# Patient Record
Sex: MALE | Race: WHITE | NOT HISPANIC OR LATINO | Employment: FULL TIME | URBAN - METROPOLITAN AREA
[De-identification: names, ages, dates, MRNs, and addresses within clinical notes are randomized per-mention and may not be internally consistent; named-entity substitution may affect disease eponyms.]

---

## 2023-07-21 ENCOUNTER — OFFICE VISIT (OUTPATIENT)
Dept: PRIMARY CARE | Facility: CLINIC | Age: 58
End: 2023-07-21
Payer: COMMERCIAL

## 2023-07-21 VITALS
DIASTOLIC BLOOD PRESSURE: 64 MMHG | WEIGHT: 210.8 LBS | BODY MASS INDEX: 30.18 KG/M2 | HEIGHT: 70 IN | SYSTOLIC BLOOD PRESSURE: 118 MMHG

## 2023-07-21 DIAGNOSIS — E03.9 HYPOTHYROIDISM, UNSPECIFIED TYPE: ICD-10-CM

## 2023-07-21 DIAGNOSIS — E78.5 DYSLIPIDEMIA: ICD-10-CM

## 2023-07-21 DIAGNOSIS — D64.9 ANEMIA, UNSPECIFIED TYPE: Primary | ICD-10-CM

## 2023-07-21 DIAGNOSIS — R73.02 IGT (IMPAIRED GLUCOSE TOLERANCE): ICD-10-CM

## 2023-07-21 DIAGNOSIS — Z00.00 ROUTINE GENERAL MEDICAL EXAMINATION AT A HEALTH CARE FACILITY: ICD-10-CM

## 2023-07-21 PROCEDURE — G0442 ANNUAL ALCOHOL SCREEN 15 MIN: HCPCS | Performed by: EMERGENCY MEDICINE

## 2023-07-21 PROCEDURE — G0444 DEPRESSION SCREEN ANNUAL: HCPCS | Performed by: EMERGENCY MEDICINE

## 2023-07-21 PROCEDURE — 1036F TOBACCO NON-USER: CPT | Performed by: EMERGENCY MEDICINE

## 2023-07-21 PROCEDURE — 99396 PREV VISIT EST AGE 40-64: CPT | Performed by: EMERGENCY MEDICINE

## 2023-07-21 PROCEDURE — G0446 INTENS BEHAVE THER CARDIO DX: HCPCS | Performed by: EMERGENCY MEDICINE

## 2023-07-21 RX ORDER — MULTIVIT-MINS/IRON/FOLIC/LYCOP 8-200-600
1 TABLET ORAL DAILY
COMMUNITY
Start: 2019-09-12

## 2023-07-21 RX ORDER — ATORVASTATIN CALCIUM 20 MG/1
1 TABLET, FILM COATED ORAL DAILY
COMMUNITY
Start: 2019-09-13 | End: 2024-01-26 | Stop reason: WASHOUT

## 2023-07-21 NOTE — PROGRESS NOTES
Subjective   Patient ID: Beau Nuñez is a 58 y.o. male who presents for Establish Care.    Assessment/Plan   Problem List Items Addressed This Visit    None    Hyperlipidemia-states that all his previous numbers have been normal.  We will recheck his lipid panel.  If normal, will discontinue his Lipitor  He has made significant dietary changes    Preventive care  Had a colonoscopy couple of years ago which was negative.  Does not have any family history of colon cancer  There is no family history of prostate cancer and therefore PSA will not be tested  Due to have low-dose CT of the thorax in view of his chronic smoking history.  This is being followed by pulmonary Dr.      PH Q-9 depression screening was completed by authorized employee of the practice and  explained the questionnaire and discussed the answers with the patient.    I screened for alcohol use    We had face-to-face discussion with this patient about the cardiovascular risk and behavioral therapies of nutritional choices, exercise and elimination of habits contradicting to the risk. We agreed on how they may be able to reduce their current cardiovascular risk.    Source of history: Nurse, Medical personnel, Medical record, Patient.  History limitation: None.    HPI    58-year-old for new patient physical  Does not have any complaints or issues at this time    Past health history-hyperlipidemia  Past surgical history-hernia surgery    Social history-chronic smoker.  Quit in 2010  Occasional alcohol  Denies drugs    Family history  Multiple family members of diabetes  Mother had breast cancer    No Known Allergies    Current Outpatient Medications   Medication Sig Dispense Refill    atorvastatin (Lipitor) 20 mg tablet Take 1 tablet (20 mg) by mouth once daily.      multivit-min-FA-lycopen-lutein (Men 50 Plus Multivitamin) 300-600-300 mcg tablet Take 1 tablet by mouth once daily.       No current facility-administered medications for this visit.  "      Objective   Visit Vitals  /64   Ht 1.778 m (5' 10\")   Wt 95.6 kg (210 lb 12.8 oz)   BMI 30.25 kg/m²   Smoking Status Former   BSA 2.17 m²     Physical Exam  Vital signs as per nursing/MA documentation  General appearance: Alert and in no acute distress  HEENT: Normal Inspection  Neck - Normal Inspection  Respiratory : No respiratory distress. Lungs are clear   Cardiovascular: heart rate normal. No gallop  Back - normal inspection  Skin inspection:Warm  Musculoskeletal : No deformities  Neuro : Limited exam. Baseline    Review of Systems   Comprehensive review of systems as allowed by patient condition and nursing input is negative    Results including lab work, imaging reports were reviewed and wherever possible, independently verified    Family History   Problem Relation Name Age of Onset    Cancer Mother       Social History     Socioeconomic History    Marital status:      Spouse name: None    Number of children: None    Years of education: None    Highest education level: None   Occupational History    None   Tobacco Use    Smoking status: Former     Types: Cigarettes    Smokeless tobacco: Never   Substance and Sexual Activity    Alcohol use: Not Currently    Drug use: None    Sexual activity: None   Other Topics Concern    None   Social History Narrative    None     Social Determinants of Health     Financial Resource Strain: Not on file   Food Insecurity: Not on file   Transportation Needs: Not on file   Physical Activity: Not on file   Stress: Not on file   Social Connections: Not on file   Intimate Partner Violence: Not on file   Housing Stability: Not on file     Past Medical History:   Diagnosis Date    Calculus of kidney     Calcium kidney stone    Cyst of kidney, acquired 09/12/2019    Renal cyst    Disorder of the skin and subcutaneous tissue, unspecified 09/12/2019    Skin lesion    Encounter for screening for malignant neoplasm of colon 11/13/2021    Screening for colon cancer    " Personal history of other diseases of the respiratory system 03/30/2022    History of upper respiratory infection    Unilateral inguinal hernia, without obstruction or gangrene, not specified as recurrent 12/09/2020    Reducible right inguinal hernia     Past Surgical History:   Procedure Laterality Date    OTHER SURGICAL HISTORY  03/18/2022    Hernia repair       Charting was completed using voice recognition technology and may include unintended errors.

## 2023-08-11 LAB
NON-UH HIE A/G RATIO: 1.7
NON-UH HIE ALB: 4.3 G/DL (ref 3.4–5)
NON-UH HIE ALK PHOS: 62 UNIT/L (ref 46–116)
NON-UH HIE BASO COUNT: 0.04 X1000 (ref 0–0.2)
NON-UH HIE BASOS %: 0.8 %
NON-UH HIE BILIRUBIN, TOTAL: 0.6 MG/DL (ref 0.2–1)
NON-UH HIE BUN/CREAT RATIO: 17.8
NON-UH HIE BUN: 16 MG/DL (ref 9–23)
NON-UH HIE CALCIUM: 9.6 MG/DL (ref 8.7–10.4)
NON-UH HIE CALCULATED LDL CHOLESTEROL: 120 MG/DL (ref 60–130)
NON-UH HIE CALCULATED OSMOLALITY: 277 MOSM/KG (ref 275–295)
NON-UH HIE CHLORIDE: 106 MMOL/L (ref 98–107)
NON-UH HIE CHOLESTEROL: 181 MG/DL (ref 100–200)
NON-UH HIE CO2, VENOUS: 28 MMOL/L (ref 20–31)
NON-UH HIE CREATININE: 0.9 MG/DL (ref 0.6–1.1)
NON-UH HIE DIFF?: NO
NON-UH HIE EOS COUNT: 0.08 X1000 (ref 0–0.5)
NON-UH HIE EOSIN %: 1.6 %
NON-UH HIE GFR AA: >60
NON-UH HIE GLOBULIN: 2.6 G/DL
NON-UH HIE GLOMERULAR FILTRATION RATE: >60 ML/MIN/1.73M?
NON-UH HIE GLUCOSE: 93 MG/DL (ref 74–106)
NON-UH HIE GOT: 27 UNIT/L (ref 15–37)
NON-UH HIE GPT: 33 UNIT/L (ref 10–49)
NON-UH HIE HCT: 44.9 % (ref 41–52)
NON-UH HIE HDL CHOLESTEROL: 41 MG/DL (ref 40–60)
NON-UH HIE HGB: 14.9 G/DL (ref 13.5–17.5)
NON-UH HIE INSTR WBC: 4.8
NON-UH HIE K: 4.2 MMOL/L (ref 3.5–5.1)
NON-UH HIE LYMPH %: 27.9 %
NON-UH HIE LYMPH COUNT: 1.34 X1000 (ref 1.2–4.8)
NON-UH HIE MCH: 28.7 PG (ref 27–34)
NON-UH HIE MCHC: 33.2 G/DL (ref 32–37)
NON-UH HIE MCV: 86.5 FL (ref 80–100)
NON-UH HIE MONO %: 9.4 %
NON-UH HIE MONO COUNT: 0.45 X1000 (ref 0.1–1)
NON-UH HIE MPV: 7.1 FL (ref 7.4–10.4)
NON-UH HIE NA: 138 MMOL/L (ref 135–145)
NON-UH HIE NEUTROPHIL %: 60.4 %
NON-UH HIE NEUTROPHIL COUNT (ANC): 2.89 X1000 (ref 1.4–8.8)
NON-UH HIE NUCLEATED RBC: 0 /100WBC
NON-UH HIE PLATELET: 289 X10 (ref 150–450)
NON-UH HIE RBC: 5.18 X10 (ref 4.7–6.1)
NON-UH HIE RDW: 13.4 % (ref 11.5–14.5)
NON-UH HIE TOTAL CHOL/HDL CHOL RATIO: 4.4
NON-UH HIE TOTAL PROTEIN: 6.9 G/DL (ref 5.7–8.2)
NON-UH HIE TRIGLYCERIDES: 98 MG/DL (ref 30–150)
NON-UH HIE TSH: 2.47 UIU/ML (ref 0.55–4.78)
NON-UH HIE WBC: 4.8 X10 (ref 4.5–11)

## 2023-08-12 LAB — NON-UH HIE HGB A1C: 5.6 %

## 2024-01-26 ENCOUNTER — OFFICE VISIT (OUTPATIENT)
Dept: PRIMARY CARE | Facility: CLINIC | Age: 59
End: 2024-01-26
Payer: COMMERCIAL

## 2024-01-26 VITALS
DIASTOLIC BLOOD PRESSURE: 85 MMHG | WEIGHT: 214 LBS | BODY MASS INDEX: 30.64 KG/M2 | SYSTOLIC BLOOD PRESSURE: 130 MMHG | HEART RATE: 63 BPM | OXYGEN SATURATION: 96 % | HEIGHT: 70 IN

## 2024-01-26 DIAGNOSIS — Z00.00 WELLNESS EXAMINATION: Primary | ICD-10-CM

## 2024-01-26 DIAGNOSIS — E78.5 HYPERLIPIDEMIA, UNSPECIFIED HYPERLIPIDEMIA TYPE: ICD-10-CM

## 2024-01-26 PROCEDURE — 99396 PREV VISIT EST AGE 40-64: CPT | Performed by: EMERGENCY MEDICINE

## 2024-01-26 PROCEDURE — 1036F TOBACCO NON-USER: CPT | Performed by: EMERGENCY MEDICINE

## 2024-01-26 PROCEDURE — G0444 DEPRESSION SCREEN ANNUAL: HCPCS | Performed by: EMERGENCY MEDICINE

## 2024-01-26 PROCEDURE — G0442 ANNUAL ALCOHOL SCREEN 15 MIN: HCPCS | Performed by: EMERGENCY MEDICINE

## 2024-01-26 PROCEDURE — 99212 OFFICE O/P EST SF 10 MIN: CPT | Performed by: EMERGENCY MEDICINE

## 2024-01-26 PROCEDURE — G0446 INTENS BEHAVE THER CARDIO DX: HCPCS | Performed by: EMERGENCY MEDICINE

## 2024-01-26 NOTE — PROGRESS NOTES
Subjective   Patient ID: Beau Nuñez is a 58 y.o. male who presents for Follow-up.    Assessment/Plan   Problem List Items Addressed This Visit    None    Hyperlipidemia-states that all his previous numbers have been normal.  We will discontinue his statin.  He has made significant dietary changes.  Recheck lipid panel in 3 months    Preventive care  Had a colonoscopy couple of years ago which was negative.  Does not have any family history of colon cancer  There is no family history of prostate cancer and therefore PSA will not be tested  Due to have low-dose CT of the thorax in view of his chronic smoking history.  This is being followed by pulmonary Dr.      PH Q-9 depression screening was completed by authorized employee of the practice and  explained the questionnaire and discussed the answers with the patient.    I screened for alcohol use    We had face-to-face discussion with this patient about the cardiovascular risk and behavioral therapies of nutritional choices, exercise and elimination of habits contradicting to the risk. We agreed on how they may be able to reduce their current cardiovascular risk.    Source of history: Nurse, Medical personnel, Medical record, Patient.  History limitation: None.    HPI    58-year-old for a physical and office visit  Does not have any complaints or issues at this time    Past health history-hyperlipidemia  Past surgical history-hernia surgery    Social history-chronic smoker.  Quit in 2010  Occasional alcohol  Denies drugs    Family history  Multiple family members of diabetes  Mother had breast cancer    No Known Allergies    Current Outpatient Medications   Medication Sig Dispense Refill    multivit-min-FA-lycopen-lutein (Men 50 Plus Multivitamin) 300-600-300 mcg tablet Take 1 tablet by mouth once daily.      atorvastatin (Lipitor) 20 mg tablet Take 1 tablet (20 mg) by mouth once daily.       No current facility-administered medications for this visit.  "      Objective   Visit Vitals  /85   Pulse 63   Ht 1.778 m (5' 10\")   Wt 97.1 kg (214 lb)   SpO2 96%   BMI 30.71 kg/m²   Smoking Status Former   BSA 2.19 m²     Physical Exam  Vital signs as per nursing/MA documentation  General appearance: Alert and in no acute distress  HEENT: Normal Inspection  Neck - Normal Inspection  Respiratory : No respiratory distress. Lungs are clear   Cardiovascular: heart rate normal. No gallop  Back - normal inspection  Skin inspection:Warm  Musculoskeletal : No deformities  Neuro : Limited exam. Baseline    Review of Systems   Comprehensive review of systems as allowed by patient condition and nursing input is negative    Results including lab work, imaging reports were reviewed and wherever possible, independently verified    Family History   Problem Relation Name Age of Onset    Cancer Mother       Social History     Socioeconomic History    Marital status:      Spouse name: None    Number of children: None    Years of education: None    Highest education level: None   Occupational History    None   Tobacco Use    Smoking status: Former     Types: Cigarettes    Smokeless tobacco: Never   Substance and Sexual Activity    Alcohol use: Not Currently    Drug use: None    Sexual activity: None   Other Topics Concern    None   Social History Narrative    None     Social Determinants of Health     Financial Resource Strain: Not on file   Food Insecurity: Not on file   Transportation Needs: Not on file   Physical Activity: Not on file   Stress: Not on file   Social Connections: Not on file   Intimate Partner Violence: Not on file   Housing Stability: Not on file     Past Medical History:   Diagnosis Date    Calculus of kidney     Calcium kidney stone    Cyst of kidney, acquired 09/12/2019    Renal cyst    Disorder of the skin and subcutaneous tissue, unspecified 09/12/2019    Skin lesion    Encounter for screening for malignant neoplasm of colon 11/13/2021    Screening for " colon cancer    Personal history of other diseases of the respiratory system 03/30/2022    History of upper respiratory infection    Unilateral inguinal hernia, without obstruction or gangrene, not specified as recurrent 12/09/2020    Reducible right inguinal hernia     Past Surgical History:   Procedure Laterality Date    OTHER SURGICAL HISTORY  03/18/2022    Hernia repair       Charting was completed using voice recognition technology and may include unintended errors.

## 2024-07-19 LAB
NON-UH HIE A/G RATIO: 1.3
NON-UH HIE ALB: 3.9 G/DL (ref 3.4–5)
NON-UH HIE ALK PHOS: 64 UNIT/L (ref 45–117)
NON-UH HIE BASO COUNT: 0.04 X1000 (ref 0–0.2)
NON-UH HIE BASOS %: 0.7 %
NON-UH HIE BILIRUBIN, TOTAL: 0.7 MG/DL (ref 0.3–1.2)
NON-UH HIE BUN/CREAT RATIO: 16.7
NON-UH HIE BUN: 15 MG/DL (ref 9–23)
NON-UH HIE CALCIUM: 9.3 MG/DL (ref 8.7–10.4)
NON-UH HIE CALCULATED LDL CHOLESTEROL: 139 MG/DL (ref 60–130)
NON-UH HIE CALCULATED OSMOLALITY: 282 MOSM/KG (ref 275–295)
NON-UH HIE CHLORIDE: 106 MMOL/L (ref 98–107)
NON-UH HIE CHOLESTEROL: 215 MG/DL (ref 100–200)
NON-UH HIE CO2, VENOUS: 29 MMOL/L (ref 20–31)
NON-UH HIE CREATININE: 0.9 MG/DL (ref 0.6–1.1)
NON-UH HIE DIFF?: NO
NON-UH HIE EOS COUNT: 0.09 X1000 (ref 0–0.5)
NON-UH HIE EOSIN %: 1.6 %
NON-UH HIE GFR AA: >60
NON-UH HIE GLOBULIN: 3.1 G/DL
NON-UH HIE GLOMERULAR FILTRATION RATE: >60 ML/MIN/1.73M?
NON-UH HIE GLUCOSE: 96 MG/DL (ref 74–106)
NON-UH HIE GOT: 21 UNIT/L (ref 15–37)
NON-UH HIE GPT: 24 UNIT/L (ref 10–49)
NON-UH HIE HCT: 42.7 % (ref 41–52)
NON-UH HIE HDL CHOLESTEROL: 39 MG/DL (ref 40–60)
NON-UH HIE HGB A1C: 5.4 %
NON-UH HIE HGB: 14.6 G/DL (ref 13.5–17.5)
NON-UH HIE INSTR WBC: 5.6
NON-UH HIE K: 3.9 MMOL/L (ref 3.5–5.1)
NON-UH HIE LYMPH %: 26.4 %
NON-UH HIE LYMPH COUNT: 1.47 X1000 (ref 1.2–4.8)
NON-UH HIE MCH: 29.3 PG (ref 27–34)
NON-UH HIE MCHC: 34.2 G/DL (ref 32–37)
NON-UH HIE MCV: 85.7 FL (ref 80–100)
NON-UH HIE MONO %: 10.5 %
NON-UH HIE MONO COUNT: 0.59 X1000 (ref 0.1–1)
NON-UH HIE MPV: 6.6 FL (ref 7.4–10.4)
NON-UH HIE NA: 141 MMOL/L (ref 135–145)
NON-UH HIE NEUTROPHIL %: 60.8 %
NON-UH HIE NEUTROPHIL COUNT (ANC): 3.39 X1000 (ref 1.4–8.8)
NON-UH HIE NUCLEATED RBC: 0 /100WBC
NON-UH HIE PLATELET: 321 X10 (ref 150–450)
NON-UH HIE RBC: 4.99 X10 (ref 4.7–6.1)
NON-UH HIE RDW: 12.9 % (ref 11.5–14.5)
NON-UH HIE TOTAL CHOL/HDL CHOL RATIO: 5.5
NON-UH HIE TOTAL PROTEIN: 7 G/DL (ref 5.7–8.2)
NON-UH HIE TRIGLYCERIDES: 186 MG/DL (ref 30–150)
NON-UH HIE TSH: 2.69 UIU/ML (ref 0.55–4.78)
NON-UH HIE WBC: 5.6 X10 (ref 4.5–11)

## 2024-07-26 ENCOUNTER — APPOINTMENT (OUTPATIENT)
Dept: PRIMARY CARE | Facility: CLINIC | Age: 59
End: 2024-07-26
Payer: COMMERCIAL

## 2024-07-26 VITALS
BODY MASS INDEX: 30.78 KG/M2 | HEIGHT: 70 IN | OXYGEN SATURATION: 95 % | DIASTOLIC BLOOD PRESSURE: 77 MMHG | WEIGHT: 215 LBS | HEART RATE: 56 BPM | SYSTOLIC BLOOD PRESSURE: 118 MMHG

## 2024-07-26 DIAGNOSIS — H93.19 TINNITUS, UNSPECIFIED LATERALITY: ICD-10-CM

## 2024-07-26 DIAGNOSIS — E78.5 HYPERLIPIDEMIA, UNSPECIFIED HYPERLIPIDEMIA TYPE: Primary | ICD-10-CM

## 2024-07-26 DIAGNOSIS — M71.21 BAKER'S CYST OF KNEE, RIGHT: ICD-10-CM

## 2024-07-26 PROCEDURE — 99213 OFFICE O/P EST LOW 20 MIN: CPT | Performed by: EMERGENCY MEDICINE

## 2024-07-26 PROCEDURE — 3008F BODY MASS INDEX DOCD: CPT | Performed by: EMERGENCY MEDICINE

## 2024-07-26 PROCEDURE — 1036F TOBACCO NON-USER: CPT | Performed by: EMERGENCY MEDICINE

## 2024-07-26 NOTE — PROGRESS NOTES
Subjective   Patient ID: Beau Nuñez is a 59 y.o. male who presents for follow up visit.    Assessment/Plan   Problem List Items Addressed This Visit    None  Patient presents for follow up visit    Last wellness: 1/26/24    Hyperlipidemia- Patient discontinued statin las visit as cholesterol was controlled. Recent lipid panel showed slightly elevated LDL's and triglycerides. Patient was counseled about diet and exercise, we will continue without statin and recheck lipids next visit.    Right knee pain - Patient has right knee pain after long days of physical labor. Upon palpitation it is likely a bakers cyst, patient was counseled to perform some physical therapy exercises and we will continue to monitor.    Tinnitus - Patient experiences some ringing in ears and vibrations when laying down to sleep at night. He has some wax blockage in ears, recommended debrox, if this does not resolve the problem we will refer to ENT.    Preventive care  Had a colonoscopy couple of years ago which was negative.  Does not have any family history of colon cancer  There is no family history of prostate cancer and therefore PSA will not be tested  Patient follows up with pulmonologist as he was a long term smoker    Source of history: Nurse, Medical personnel, Medical record, Patient.  History limitation: None.    HPI    58-year-old for office visit  Does not have any complaints or issues at this time    Past health history-hyperlipidemia  Past surgical history-hernia surgery    Social history-chronic smoker.  Quit in 2010  Occasional alcohol  Denies drugs    Family history  Multiple family members of diabetes  Mother had breast cancer    No Known Allergies    Current Outpatient Medications   Medication Sig Dispense Refill    multivit-min-FA-lycopen-lutein (Men 50 Plus Multivitamin) 300-600-300 mcg tablet Take 1 tablet by mouth once daily.       No current facility-administered medications for this visit.       Objective    Visit Vitals  Smoking Status Former     Physical Exam  Vital signs as per nursing/MA documentation  General appearance: Alert and in no acute distress  HEENT: Normal Inspection  Neck - Normal Inspection  Respiratory : No respiratory distress. Lungs are clear   Cardiovascular: heart rate normal. No gallop  Back - normal inspection  Skin inspection:Warm  Musculoskeletal : No deformities  Neuro : Limited exam. Baseline    Review of Systems   Comprehensive review of systems as allowed by patient condition and nursing input is negative    Results including lab work, imaging reports were reviewed and wherever possible, independently verified    Family History   Problem Relation Name Age of Onset    Cancer Mother       Social History     Socioeconomic History    Marital status:    Tobacco Use    Smoking status: Former     Types: Cigarettes    Smokeless tobacco: Never   Substance and Sexual Activity    Alcohol use: Not Currently     Past Medical History:   Diagnosis Date    Calculus of kidney     Calcium kidney stone    Cyst of kidney, acquired 09/12/2019    Renal cyst    Disorder of the skin and subcutaneous tissue, unspecified 09/12/2019    Skin lesion    Encounter for screening for malignant neoplasm of colon 11/13/2021    Screening for colon cancer    Personal history of other diseases of the respiratory system 03/30/2022    History of upper respiratory infection    Unilateral inguinal hernia, without obstruction or gangrene, not specified as recurrent 12/09/2020    Reducible right inguinal hernia     Past Surgical History:   Procedure Laterality Date    OTHER SURGICAL HISTORY  03/18/2022    Hernia repair       Charting was completed using voice recognition technology and may include unintended errors.

## 2024-08-16 ENCOUNTER — TELEPHONE (OUTPATIENT)
Dept: PRIMARY CARE | Facility: CLINIC | Age: 59
End: 2024-08-16

## 2024-08-16 NOTE — TELEPHONE ENCOUNTER
Pt called stated he has swelling in his genital area and is wondering if he should come in to see the doctor or if he should see a specialist

## 2024-08-21 ENCOUNTER — APPOINTMENT (OUTPATIENT)
Dept: PRIMARY CARE | Facility: CLINIC | Age: 59
End: 2024-08-21
Payer: COMMERCIAL

## 2024-08-21 VITALS
WEIGHT: 216.4 LBS | SYSTOLIC BLOOD PRESSURE: 119 MMHG | HEIGHT: 70 IN | BODY MASS INDEX: 30.98 KG/M2 | OXYGEN SATURATION: 96 % | DIASTOLIC BLOOD PRESSURE: 70 MMHG | HEART RATE: 87 BPM

## 2024-08-21 DIAGNOSIS — J43.9 PULMONARY EMPHYSEMA, UNSPECIFIED EMPHYSEMA TYPE (MULTI): Primary | ICD-10-CM

## 2024-08-21 DIAGNOSIS — N50.89 SCROTUM SWELLING: ICD-10-CM

## 2024-08-21 PROCEDURE — 3008F BODY MASS INDEX DOCD: CPT | Performed by: EMERGENCY MEDICINE

## 2024-08-21 PROCEDURE — 99213 OFFICE O/P EST LOW 20 MIN: CPT | Performed by: EMERGENCY MEDICINE

## 2024-08-21 PROCEDURE — 1036F TOBACCO NON-USER: CPT | Performed by: EMERGENCY MEDICINE

## 2024-08-21 ASSESSMENT — PATIENT HEALTH QUESTIONNAIRE - PHQ9
2. FEELING DOWN, DEPRESSED OR HOPELESS: NOT AT ALL
SUM OF ALL RESPONSES TO PHQ9 QUESTIONS 1 AND 2: 0
1. LITTLE INTEREST OR PLEASURE IN DOING THINGS: NOT AT ALL

## 2024-08-21 NOTE — PROGRESS NOTES
Call the [facility] [phone] Subjective   Patient ID: Beau Nuñez is a 59 y.o. male who presents for follow up visit.    Assessment/Plan   Problem List Items Addressed This Visit    None  Patient presents for follow up visit    Last wellness: 1/26/24    Scrotal swelling-we will obtain ultrasound.  Hydrocele versus hernia.  Patient does not have pain difficulty urinating or any other symptoms    Hyperlipidemia- Patient discontinued statin las visit as cholesterol was controlled. Recent lipid panel showed slightly elevated LDL's and triglycerides. Patient was counseled about diet and exercise, we will continue without statin and recheck lipids next visit.    Right knee pain - Patient has right knee pain after long days of physical labor. Upon palpitation it is likely a bakers cyst, patient was counseled to perform some physical therapy exercises and we will continue to monitor.    Tinnitus - Patient experiences some ringing in ears and vibrations when laying down to sleep at night. He has some wax blockage in ears, recommended debrox, if this does not resolve the problem we will refer to ENT.    Preventive care  Had a colonoscopy couple of years ago which was negative.  Does not have any family history of colon cancer  There is no family history of prostate cancer and therefore PSA will not be tested  Patient follows up with pulmonologist as he was a long term smoker    Source of history: Nurse, Medical personnel, Medical record, Patient.  History limitation: None.    HPI    58-year-old for office visit  Noticed that his scrotum was swollen.  Denies pain  Denies trouble urinating    Past health history-hyperlipidemia  Past surgical history-hernia surgery    Social history-chronic smoker.  Quit in 2010  Occasional alcohol  Denies drugs    Family history  Multiple family members of diabetes  Mother had breast cancer    No Known Allergies    Current Outpatient Medications   Medication Sig Dispense Refill    multivit-min-FA-lycopen-lutein (Men  50 Plus Multivitamin) 300-600-300 mcg tablet Take 1 tablet by mouth once daily.       No current facility-administered medications for this visit.         Physical Exam  Vital signs as per nursing/MA documentation  General appearance: Alert and in no acute distress  HEENT: Normal Inspection  Neck - Normal Inspection  Respiratory : No respiratory distress. Lungs are clear   Cardiovascular: heart rate normal. No gallop  Back - normal inspection  Skin inspection:Warm  Musculoskeletal : No deformities  Neuro : Limited exam. Baseline    Review of Systems   Comprehensive review of systems as allowed by patient condition and nursing input is negative    Results including lab work, imaging reports were reviewed and wherever possible, independently verified    Family History   Problem Relation Name Age of Onset    Cancer Mother       Social History     Socioeconomic History    Marital status:    Tobacco Use    Smoking status: Former     Types: Cigarettes    Smokeless tobacco: Never   Substance and Sexual Activity    Alcohol use: Not Currently     Past Medical History:   Diagnosis Date    Calculus of kidney     Calcium kidney stone    Cyst of kidney, acquired 09/12/2019    Renal cyst    Disorder of the skin and subcutaneous tissue, unspecified 09/12/2019    Skin lesion    Encounter for screening for malignant neoplasm of colon 11/13/2021    Screening for colon cancer    Personal history of other diseases of the respiratory system 03/30/2022    History of upper respiratory infection    Unilateral inguinal hernia, without obstruction or gangrene, not specified as recurrent 12/09/2020    Reducible right inguinal hernia     Past Surgical History:   Procedure Laterality Date    OTHER SURGICAL HISTORY  03/18/2022    Hernia repair       Charting was completed using voice recognition technology and may include unintended errors.

## 2024-09-20 ENCOUNTER — HOSPITAL ENCOUNTER (OUTPATIENT)
Dept: RADIOLOGY | Facility: HOSPITAL | Age: 59
Discharge: HOME | End: 2024-09-20
Payer: COMMERCIAL

## 2024-09-20 DIAGNOSIS — R91.1 SOLITARY PULMONARY NODULE: ICD-10-CM

## 2024-09-20 PROCEDURE — 71250 CT THORAX DX C-: CPT

## 2025-01-10 ENCOUNTER — TELEPHONE (OUTPATIENT)
Dept: PRIMARY CARE | Facility: CLINIC | Age: 60
End: 2025-01-10
Payer: COMMERCIAL

## 2025-01-10 DIAGNOSIS — E03.9 HYPOTHYROIDISM, UNSPECIFIED TYPE: ICD-10-CM

## 2025-01-10 DIAGNOSIS — Z00.00 ROUTINE GENERAL MEDICAL EXAMINATION AT A HEALTH CARE FACILITY: ICD-10-CM

## 2025-01-10 DIAGNOSIS — E78.5 DYSLIPIDEMIA: ICD-10-CM

## 2025-01-10 DIAGNOSIS — R73.02 IGT (IMPAIRED GLUCOSE TOLERANCE): ICD-10-CM

## 2025-01-10 DIAGNOSIS — D64.9 ANEMIA, UNSPECIFIED TYPE: ICD-10-CM

## 2025-01-10 NOTE — TELEPHONE ENCOUNTER
FAX LAB ORDER TO [facility]. HAS APPT ON 1/24/25. STATES DR TOLD HIM TO GET LABS AT LAST APPT. CAN YOU MAIL REQ TO HIM ALSO

## 2025-01-18 LAB
NON-UH HIE A/G RATIO: 1.4
NON-UH HIE ALB: 4.1 G/DL (ref 3.4–5)
NON-UH HIE ALK PHOS: 64 UNIT/L (ref 45–117)
NON-UH HIE BASO COUNT: 0.04 X1000 (ref 0–0.2)
NON-UH HIE BASOS %: 0.8 %
NON-UH HIE BILIRUBIN, TOTAL: 0.7 MG/DL (ref 0.3–1.2)
NON-UH HIE BUN/CREAT RATIO: 20
NON-UH HIE BUN: 18 MG/DL (ref 9–23)
NON-UH HIE CALCIUM: 9.7 MG/DL (ref 8.7–10.4)
NON-UH HIE CALCULATED LDL CHOLESTEROL: 139 MG/DL (ref 60–130)
NON-UH HIE CALCULATED OSMOLALITY: 283 MOSM/KG (ref 275–295)
NON-UH HIE CHLORIDE: 103 MMOL/L (ref 98–107)
NON-UH HIE CHOLESTEROL: 203 MG/DL (ref 100–200)
NON-UH HIE CO2, VENOUS: 31 MMOL/L (ref 20–31)
NON-UH HIE CREATININE: 0.9 MG/DL (ref 0.6–1.1)
NON-UH HIE DIFF?: NO
NON-UH HIE EOS COUNT: 0.06 X1000 (ref 0–0.5)
NON-UH HIE EOSIN %: 1.1 %
NON-UH HIE GFR AA: >60
NON-UH HIE GLOBULIN: 2.9 G/DL
NON-UH HIE GLOMERULAR FILTRATION RATE: >60 ML/MIN/1.73M?
NON-UH HIE GLUCOSE: 87 MG/DL (ref 74–106)
NON-UH HIE GOT: 20 UNIT/L (ref 15–37)
NON-UH HIE GPT: 19 UNIT/L (ref 10–49)
NON-UH HIE HCT: 44.5 % (ref 41–52)
NON-UH HIE HDL CHOLESTEROL: 42 MG/DL (ref 40–60)
NON-UH HIE HGB A1C: 5.4 %
NON-UH HIE HGB: 15 G/DL (ref 13.5–17.5)
NON-UH HIE INSTR WBC: 5
NON-UH HIE K: 4.3 MMOL/L (ref 3.5–5.1)
NON-UH HIE LYMPH %: 23.9 %
NON-UH HIE LYMPH COUNT: 1.2 X1000 (ref 1.2–4.8)
NON-UH HIE MCH: 29.5 PG (ref 27–34)
NON-UH HIE MCHC: 33.7 G/DL (ref 32–37)
NON-UH HIE MCV: 87.5 FL (ref 80–100)
NON-UH HIE MONO %: 9.6 %
NON-UH HIE MONO COUNT: 0.48 X1000 (ref 0.1–1)
NON-UH HIE MPV: 7 FL (ref 7.4–10.4)
NON-UH HIE NA: 141 MMOL/L (ref 135–145)
NON-UH HIE NEUTROPHIL %: 64.5 %
NON-UH HIE NEUTROPHIL COUNT (ANC): 3.24 X1000 (ref 1.4–8.8)
NON-UH HIE NUCLEATED RBC: 0 /100WBC
NON-UH HIE PLATELET: 320 X10 (ref 150–450)
NON-UH HIE RBC: 5.09 X10 (ref 4.7–6.1)
NON-UH HIE RDW: 13 % (ref 11.5–14.5)
NON-UH HIE TOTAL CHOL/HDL CHOL RATIO: 4.8
NON-UH HIE TOTAL PROTEIN: 7 G/DL (ref 5.7–8.2)
NON-UH HIE TRIGLYCERIDES: 108 MG/DL (ref 30–150)
NON-UH HIE TSH: 1.92 UIU/ML (ref 0.55–4.78)
NON-UH HIE WBC: 5 X10 (ref 4.5–11)

## 2025-01-24 ENCOUNTER — APPOINTMENT (OUTPATIENT)
Dept: PRIMARY CARE | Facility: CLINIC | Age: 60
End: 2025-01-24
Payer: COMMERCIAL

## 2025-01-24 VITALS
HEIGHT: 70 IN | SYSTOLIC BLOOD PRESSURE: 114 MMHG | WEIGHT: 198 LBS | OXYGEN SATURATION: 96 % | BODY MASS INDEX: 28.35 KG/M2 | HEART RATE: 60 BPM | DIASTOLIC BLOOD PRESSURE: 74 MMHG

## 2025-01-24 DIAGNOSIS — J43.9 PULMONARY EMPHYSEMA, UNSPECIFIED EMPHYSEMA TYPE (MULTI): ICD-10-CM

## 2025-01-24 DIAGNOSIS — H93.19 TINNITUS, UNSPECIFIED LATERALITY: ICD-10-CM

## 2025-01-24 DIAGNOSIS — E78.5 HYPERLIPIDEMIA, UNSPECIFIED HYPERLIPIDEMIA TYPE: ICD-10-CM

## 2025-01-24 DIAGNOSIS — Z00.00 PHYSICAL EXAM: Primary | ICD-10-CM

## 2025-01-24 PROCEDURE — 1036F TOBACCO NON-USER: CPT | Performed by: EMERGENCY MEDICINE

## 2025-01-24 PROCEDURE — 3008F BODY MASS INDEX DOCD: CPT | Performed by: EMERGENCY MEDICINE

## 2025-01-24 PROCEDURE — 99396 PREV VISIT EST AGE 40-64: CPT | Performed by: EMERGENCY MEDICINE

## 2025-01-24 PROCEDURE — 96127 BRIEF EMOTIONAL/BEHAV ASSMT: CPT | Performed by: EMERGENCY MEDICINE

## 2025-01-24 NOTE — PROGRESS NOTES
Subjective   Patient ID: Beau Nuñez is a 59 y.o. male who presents for follow up and physical    Assessment/Plan   Problem List Items Addressed This Visit       Pulmonary emphysema, unspecified emphysema type (Multi)   Patient presents for physical exam    Scrotal swelling- Currently controlled    Hyperlipidemia- Patient discontinued statin las visit as cholesterol was controlled. Recent lipid panel showed slightly elevated LDL's and triglycerides. Patient has been consistent with diet and exercise, we will continue without statin and recheck lipids next visit.    Right knee pain - Currently controlled    Tinnitus - Patient experiences some ringing in ears and vibrations when laying down to sleep at night. He has some wax blockage in ears, recommended debrox, if this does not resolve the problem we will refer to ENT.    Preventive care  Had a colonoscopy couple of years ago which was negative.  Does not have any family history of colon cancer  There is no family history of prostate cancer and therefore PSA will not be tested  Patient follows up with pulmonologist as he was a long term smoker    PH Q-9 depression screening was completed by authorized employee of the practice for 5-10 minutes and  explained the questionnaire and discussed the answers with the patient.     Alcohol screening was completed for 5 to 10 minutes    Source of history: Nurse, Medical personnel, Medical record, Patient.  History limitation: None.    HPI    Patient presents for physical exam    He has been working on his health and is consistent with diet and exercise, therefore has lost weight since last visit.    Past health history-hyperlipidemia  Past surgical history-hernia surgery    Social history-chronic smoker.  Quit in 2010  Occasional alcohol  Denies drugs    Family history  Multiple family members of diabetes  Mother had breast cancer    No Known Allergies    Current Outpatient Medications   Medication Sig Dispense Refill     multivit-min-FA-lycopen-lutein (Men 50 Plus Multivitamin) 300-600-300 mcg tablet Take 1 tablet by mouth once daily.       No current facility-administered medications for this visit.         Physical Exam  Vital signs as per nursing/MA documentation  General appearance: Alert and in no acute distress  HEENT: Normal Inspection  Neck - Normal Inspection  Respiratory : No respiratory distress. Lungs are clear   Cardiovascular: heart rate normal. No gallop  Back - normal inspection  Skin inspection:Warm  Musculoskeletal : No deformities  Neuro : Limited exam. Baseline    Review of Systems   Comprehensive review of systems as allowed by patient condition and nursing input is negative    Results including lab work, imaging reports were reviewed and wherever possible, independently verified    Family History   Problem Relation Name Age of Onset    Cancer Mother       Social History     Socioeconomic History    Marital status:    Tobacco Use    Smoking status: Former     Types: Cigarettes    Smokeless tobacco: Never   Substance and Sexual Activity    Alcohol use: Not Currently     Past Medical History:   Diagnosis Date    Calculus of kidney     Calcium kidney stone    Cyst of kidney, acquired 09/12/2019    Renal cyst    Disorder of the skin and subcutaneous tissue, unspecified 09/12/2019    Skin lesion    Encounter for screening for malignant neoplasm of colon 11/13/2021    Screening for colon cancer    Personal history of other diseases of the respiratory system 03/30/2022    History of upper respiratory infection    Unilateral inguinal hernia, without obstruction or gangrene, not specified as recurrent 12/09/2020    Reducible right inguinal hernia     Past Surgical History:   Procedure Laterality Date    OTHER SURGICAL HISTORY  03/18/2022    Hernia repair       Charting was completed using voice recognition technology and may include unintended errors.

## 2025-02-12 NOTE — PROGRESS NOTES
"AUDIOLOGY ADULT AUDIOMETRIC EVALUATION      Name:  Beau Nuñez  :  1965  Age:  60 y.o.  Date of Evaluation:  25    HISTORY  Reason for visit:   ear blockage  Mr. Nuñez is seen 25 at the request of Bessy Quintanilla CNP for an evaluation of hearing.      Chief complaint:    Ear feels blocked    Hearing loss:  left worse than right   Tinnitus:   hears heartbeat at night; ringing at other times; both possibly left more than right; not bothersome  Otitis Media: denies  Otologic surgical history:  denies  Dizziness/imbalance:  denies  Otalgia:  denies  Ear pressure/fullness:  denies  History of excessive noise exposure:  yes  Other: none    Hearing aid history: none          EVALUATION  Please find audiogram in \"Media\" tab (Document Type:  Audiology Report) or included at the bottom of this note.    RESULTS   Otoscopic Evaluation: right, clear canal; left, cerumen (tympanometry indicated cerumen to be non-occlusive)     Immittance Measures (226 Hz probe tone):   Tympanometry is consistent with normal middle ear pressure and normal tympanic membrane mobility bilaterally.       Ipsilateral acoustic reflexes were present for 500-4000 Hz bilaterally.       Test technique:  standard behavioral technique via TDH earphones (checked with insert earphones).  Reliability is good.    Pure Tone Audiometry:  Hearing sensitivity is in the normal hearing to moderate hearing loss range bilaterally.      Note asymmetry for 3000 Hz (left worse than right)     Speech Audiometry:        Right Ear:  Speech Reception Threshold (SRT) was obtained at 30 dBHL                 Speech discrimination score was 96% in quiet when words were presented at 80 dBHL      Left Ear:  Speech Reception Threshold (SRT) was obtained at 25 dBHL                 Speech discrimination score was 92% in quiet when words were presented at 75 dBHL    IMPRESSIONS:  Patient is expected to have communication difficulty in adverse listening " environments.    Patient is expected to benefit from devices that provide amplification (e.g., hearing aids) and improve the desired sound signal over that of background noise as well as from effective communication strategies.      RECOMMENDATIONS  Continue with medical follow-up with Bessy Quintanilla CNP.  Hearing Aid Evaluation with an audiologist to discuss hearing technology (such as hearing aids) and services.  Reassess hearing in 1 year (or sooner if medically indicated or if there is a concern for a change in hearing).    Continue with medical follow-up as indicated.      PATIENT EDUCATION  Discussed results and recommendations with patient.  Questions were addressed and the patient was encouraged to contact our department should concerns arise.       TENZIN Barrientos, CCC-A  Licensed Audiologist

## 2025-02-13 ENCOUNTER — CLINICAL SUPPORT (OUTPATIENT)
Dept: AUDIOLOGY | Facility: CLINIC | Age: 60
End: 2025-02-13
Payer: COMMERCIAL

## 2025-02-13 ENCOUNTER — OFFICE VISIT (OUTPATIENT)
Dept: OTOLARYNGOLOGY | Facility: CLINIC | Age: 60
End: 2025-02-13
Payer: COMMERCIAL

## 2025-02-13 VITALS
HEIGHT: 70 IN | SYSTOLIC BLOOD PRESSURE: 111 MMHG | HEART RATE: 63 BPM | BODY MASS INDEX: 28.35 KG/M2 | TEMPERATURE: 98.2 F | DIASTOLIC BLOOD PRESSURE: 72 MMHG | WEIGHT: 198 LBS

## 2025-02-13 DIAGNOSIS — H61.22 EXCESSIVE CERUMEN IN EAR CANAL, LEFT: Primary | ICD-10-CM

## 2025-02-13 DIAGNOSIS — H90.3 SNHL (SENSORY-NEURAL HEARING LOSS), ASYMMETRICAL: ICD-10-CM

## 2025-02-13 DIAGNOSIS — H90.3 SENSORINEURAL HEARING LOSS (SNHL) OF BOTH EARS: ICD-10-CM

## 2025-02-13 DIAGNOSIS — H93.13 TINNITUS, BILATERAL: Primary | ICD-10-CM

## 2025-02-13 DIAGNOSIS — H93.13 TINNITUS OF BOTH EARS: ICD-10-CM

## 2025-02-13 PROCEDURE — 1036F TOBACCO NON-USER: CPT | Performed by: NURSE PRACTITIONER

## 2025-02-13 PROCEDURE — 99213 OFFICE O/P EST LOW 20 MIN: CPT | Performed by: NURSE PRACTITIONER

## 2025-02-13 PROCEDURE — 92550 TYMPANOMETRY & REFLEX THRESH: CPT | Mod: 52 | Performed by: AUDIOLOGIST

## 2025-02-13 PROCEDURE — 99203 OFFICE O/P NEW LOW 30 MIN: CPT | Performed by: NURSE PRACTITIONER

## 2025-02-13 PROCEDURE — 3008F BODY MASS INDEX DOCD: CPT | Performed by: NURSE PRACTITIONER

## 2025-02-13 PROCEDURE — 92557 COMPREHENSIVE HEARING TEST: CPT | Performed by: AUDIOLOGIST

## 2025-02-13 SDOH — ECONOMIC STABILITY: FOOD INSECURITY: WITHIN THE PAST 12 MONTHS, YOU WORRIED THAT YOUR FOOD WOULD RUN OUT BEFORE YOU GOT MONEY TO BUY MORE.: NEVER TRUE

## 2025-02-13 SDOH — ECONOMIC STABILITY: FOOD INSECURITY: WITHIN THE PAST 12 MONTHS, THE FOOD YOU BOUGHT JUST DIDN'T LAST AND YOU DIDN'T HAVE MONEY TO GET MORE.: NEVER TRUE

## 2025-02-13 ASSESSMENT — LIFESTYLE VARIABLES
SKIP TO QUESTIONS 9-10: 1
HOW MANY STANDARD DRINKS CONTAINING ALCOHOL DO YOU HAVE ON A TYPICAL DAY: PATIENT DOES NOT DRINK
AUDIT-C TOTAL SCORE: 0
HOW OFTEN DO YOU HAVE A DRINK CONTAINING ALCOHOL: NEVER
HOW OFTEN DO YOU HAVE SIX OR MORE DRINKS ON ONE OCCASION: NEVER

## 2025-02-13 ASSESSMENT — COLUMBIA-SUICIDE SEVERITY RATING SCALE - C-SSRS
2. HAVE YOU ACTUALLY HAD ANY THOUGHTS OF KILLING YOURSELF?: NO
1. IN THE PAST MONTH, HAVE YOU WISHED YOU WERE DEAD OR WISHED YOU COULD GO TO SLEEP AND NOT WAKE UP?: NO
6. HAVE YOU EVER DONE ANYTHING, STARTED TO DO ANYTHING, OR PREPARED TO DO ANYTHING TO END YOUR LIFE?: NO

## 2025-02-13 ASSESSMENT — ENCOUNTER SYMPTOMS
LOSS OF SENSATION IN FEET: 0
OCCASIONAL FEELINGS OF UNSTEADINESS: 0
DEPRESSION: 0

## 2025-02-13 ASSESSMENT — PATIENT HEALTH QUESTIONNAIRE - PHQ9
1. LITTLE INTEREST OR PLEASURE IN DOING THINGS: NOT AT ALL
2. FEELING DOWN, DEPRESSED OR HOPELESS: NOT AT ALL
SUM OF ALL RESPONSES TO PHQ9 QUESTIONS 1 AND 2: 0

## 2025-02-13 ASSESSMENT — PAIN SCALES - GENERAL: PAINLEVEL_OUTOF10: 0-NO PAIN

## 2025-02-13 NOTE — PROGRESS NOTES
Subjective   Patient ID: Beau Nuñez is a 60 y.o. male who presents for ears blocked.    HPI  Patient here for his ears. He feels some blockage on the left side. Has used ear drops that seem to block it more. This has been going on for month. Denies ear pain and drainage. He gets tinnitus, more in the left ear. He gets some pulsatile tinnitus at night when it's quiet. Denies vertigo.  Denies previous ear surgery. He admits to loud noise exposure. His father had hearing loss.    I reviewed patient's past medical and surgical history.  Patient Active Problem List   Diagnosis    Pulmonary emphysema, unspecified emphysema type (Multi)     Past Surgical History:   Procedure Laterality Date    OTHER SURGICAL HISTORY  03/18/2022    Hernia repair     Review of Systems    All other systems have been reviewed and are negative for complaints except for those mentioned in history of present illness, past medical history and problem list.    Objective   Physical Exam    Constitutional: No fever, chills, weight loss or weight gain  General appearance: Appears well, well-nourished, well groomed. No acute distress.    Communication: Normal communication    Psychiatric: Oriented to person, place and time. Normal mood and affect.    Neurologic: Cranial nerves II-XII grossly intact and symmetric bilaterally.    Head and Face:  Head: Atraumatic with no masses, lesions or scarring.  Face: Normal symmetry. No scars or deformities.  TMJ: Normal, no trismus.    Eyes: Conjunctiva not edematous or erythematous.     Right Ear: External inspection of ear with no deformity, scars, or masses. EAC is clear.  TM is intact with no sign of infection, effusion, or retraction.  No perforation seen.     Left Ear: External inspection of ear with no deformity, scars, or masses. Ear canal is with non-occluding cerumen that was removed using the microscope and alligator forceps. After removal, TM is intact with no sign of infection, effusion, or  retraction.      Nose: External inspection of nose: No nasal lesions, lacerations or scars. Anterior rhinoscopy with limited visualization past the inferior turbinates. No tenderness on frontal or maxillary sinus palpation.    Oral Cavity/Mouth: Oral cavity and oropharynx mucosa moist and pink. No lesions or masses. Tonsils appear normal. Uvula is midline. Tongue with no masses or lesions. Tongue with good mobility. The oropharynx is clear.    Neck: Normal appearing, symmetric, trachea midline.     Cardiovascular: Examination of peripheral vascular system shows no clubbing or cyanosis.    Respiratory: No respiratory distress increased work of breathing. Inspection of the chest with symmetric chest expansion and normal respiratory effort.    Skin: No head and neck rashes.    Lymph nodes: No adenopathy.    Diagnostic Results   I personally interpreted audiogram from today.      Assessment/Plan   Diagnoses and all orders for this visit:  Excessive cerumen in ear canal, left  Sensorineural hearing loss (SNHL) of both ears  Tinnitus of both ears    The left ear was successfully cleaned.  Patient had significant improving in his hearing on that side after removal.  We reviewed his audiogram in detail.  I feel that the slight asymmetry on the left may be secondary to the cerumen.  We did briefly discuss MRI IAC to rule out retrocochlear pathology: Since patient has blockage feels better he would like to monitor at this time.  He is not interested in hearing aids at this time and we will plan to repeat audiogram in 1 year to monitor his hearing.  I advised him to call the office if he feels that the fullness in the left ear has returned.    All questions answered to patient's satisfaction.    This note was created using speech recognition transcription software. Despite proofreading, several typographical errors might be present that might affect the meaning of the content. Please call with any questions.         Bessy CHAMPAGNE  LEYDA Quintanilla-CNP 02/13/25 9:23 AM

## 2025-03-04 ENCOUNTER — TELEPHONE (OUTPATIENT)
Dept: PRIMARY CARE | Facility: CLINIC | Age: 60
End: 2025-03-04
Payer: COMMERCIAL

## 2025-03-04 DIAGNOSIS — R05.9 COUGH, UNSPECIFIED TYPE: ICD-10-CM

## 2025-03-04 DIAGNOSIS — A49.9 BACTERIAL INFECTION: ICD-10-CM

## 2025-03-04 RX ORDER — AZITHROMYCIN 250 MG/1
250 TABLET, FILM COATED ORAL DAILY
Qty: 6 TABLET | Refills: 0 | Status: SHIPPED | OUTPATIENT
Start: 2025-03-04 | End: 2025-03-09

## 2025-03-04 NOTE — TELEPHONE ENCOUNTER
Pt stated that he cannot come in or do virtual/ phone call since he will be at work. Would like to know if you can prescribe him an antibiotic. He is experiencing cough, sneezing, green phlegm.

## 2025-07-25 ENCOUNTER — APPOINTMENT (OUTPATIENT)
Dept: PRIMARY CARE | Facility: CLINIC | Age: 60
End: 2025-07-25
Payer: COMMERCIAL

## 2025-09-26 ENCOUNTER — APPOINTMENT (OUTPATIENT)
Dept: PRIMARY CARE | Facility: CLINIC | Age: 60
End: 2025-09-26
Payer: COMMERCIAL